# Patient Record
Sex: FEMALE | Race: WHITE | NOT HISPANIC OR LATINO | Employment: OTHER | URBAN - METROPOLITAN AREA
[De-identification: names, ages, dates, MRNs, and addresses within clinical notes are randomized per-mention and may not be internally consistent; named-entity substitution may affect disease eponyms.]

---

## 2021-10-04 ENCOUNTER — PATIENT MESSAGE (OUTPATIENT)
Dept: NEUROLOGY | Facility: CLINIC | Age: 81
End: 2021-10-04

## 2021-11-29 ENCOUNTER — HOSPITAL ENCOUNTER (OUTPATIENT)
Dept: RADIOLOGY | Facility: HOSPITAL | Age: 81
Discharge: HOME | End: 2021-11-29
Attending: PSYCHIATRY & NEUROLOGY
Payer: MEDICARE

## 2021-11-29 ENCOUNTER — OFFICE VISIT (OUTPATIENT)
Dept: NEUROLOGY | Facility: CLINIC | Age: 81
End: 2021-11-29
Payer: MEDICARE

## 2021-11-29 VITALS
SYSTOLIC BLOOD PRESSURE: 148 MMHG | DIASTOLIC BLOOD PRESSURE: 78 MMHG | WEIGHT: 110.69 LBS | RESPIRATION RATE: 14 BRPM | HEART RATE: 50 BPM | BODY MASS INDEX: 21.73 KG/M2 | HEIGHT: 60 IN

## 2021-11-29 DIAGNOSIS — R41.3 OTHER AMNESIA: ICD-10-CM

## 2021-11-29 DIAGNOSIS — R00.1 BRADYCARDIA: Primary | ICD-10-CM

## 2021-11-29 PROCEDURE — 99999 PR STA SHADOW: ICD-10-PCS | Mod: PBBFAC,,, | Performed by: PSYCHIATRY & NEUROLOGY

## 2021-11-29 PROCEDURE — 99214 OFFICE O/P EST MOD 30 MIN: CPT | Mod: PBBFAC,25 | Performed by: PSYCHIATRY & NEUROLOGY

## 2021-11-29 PROCEDURE — 99999 PR PBB SHADOW E&M-EST. PATIENT-LVL IV: CPT | Mod: PBBFAC,,, | Performed by: PSYCHIATRY & NEUROLOGY

## 2021-11-29 PROCEDURE — 70450 CT HEAD WITHOUT CONTRAST: ICD-10-PCS | Mod: 26,,, | Performed by: RADIOLOGY

## 2021-11-29 PROCEDURE — 99204 OFFICE O/P NEW MOD 45 MIN: CPT | Mod: S$PBB | Performed by: PSYCHIATRY & NEUROLOGY

## 2021-11-29 PROCEDURE — 99999 PR STA SHADOW: CPT | Mod: PBBFAC,,, | Performed by: PSYCHIATRY & NEUROLOGY

## 2021-11-29 PROCEDURE — 70450 CT HEAD/BRAIN W/O DYE: CPT | Mod: TC

## 2021-11-29 PROCEDURE — 70450 CT HEAD/BRAIN W/O DYE: CPT | Mod: 26,,, | Performed by: RADIOLOGY

## 2021-11-29 RX ORDER — ROSUVASTATIN CALCIUM 10 MG/1
10 TABLET, COATED ORAL DAILY
COMMUNITY
Start: 2021-10-11

## 2021-11-29 RX ORDER — MULTIVITAMIN
1 TABLET ORAL DAILY
COMMUNITY

## 2021-11-29 RX ORDER — DONEPEZIL HYDROCHLORIDE 5 MG/1
5 TABLET, FILM COATED ORAL DAILY
COMMUNITY
Start: 2021-10-12 | End: 2023-08-16 | Stop reason: SDUPTHER

## 2021-11-29 RX ORDER — LISINOPRIL 20 MG/1
20 TABLET ORAL 2 TIMES DAILY
COMMUNITY
Start: 2021-10-10

## 2021-11-29 RX ORDER — METOPROLOL TARTRATE 50 MG/1
25 TABLET ORAL DAILY
COMMUNITY
Start: 2021-11-20 | End: 2023-08-16

## 2022-01-25 ENCOUNTER — TELEPHONE (OUTPATIENT)
Dept: NEUROLOGY | Facility: HOSPITAL | Age: 82
End: 2022-01-25
Payer: MEDICARE

## 2022-01-25 RX ORDER — MEMANTINE HYDROCHLORIDE 5 MG/1
TABLET ORAL
Qty: 60 TABLET | Refills: 11 | Status: SHIPPED | OUTPATIENT
Start: 2022-01-25 | End: 2022-06-23

## 2022-01-25 NOTE — TELEPHONE ENCOUNTER
Notify patient's family: Memory testing results (which I was told they have also reviewed with neuropsychology) does show changes consistent with  Alzheimer's. Her history of high blood pressure may have also contributed but mostly the findings are due to Alzheimer's.   I want to review the following:    Continue donepezil for memory.  Lets add Namenda- another med for memory- per orders. Usually this is well tolerated.  Regarding her driving: Please be sure family is going to monitor her driving closely. If there are any concerning incidents, she should stop driving or consider a formal driving evaluation.    Keep follow up appointment as scheduled        MD noted: AD with possible additional mixed etiology from vascular risk factors

## 2022-04-11 ENCOUNTER — OFFICE VISIT (OUTPATIENT)
Dept: NEUROLOGY | Facility: CLINIC | Age: 82
End: 2022-04-11
Payer: MEDICARE

## 2022-04-11 VITALS
DIASTOLIC BLOOD PRESSURE: 78 MMHG | HEART RATE: 70 BPM | BODY MASS INDEX: 23.33 KG/M2 | RESPIRATION RATE: 14 BRPM | HEIGHT: 59 IN | SYSTOLIC BLOOD PRESSURE: 164 MMHG | WEIGHT: 115.75 LBS

## 2022-04-11 DIAGNOSIS — G30.9 ALZHEIMER DISEASE: Primary | ICD-10-CM

## 2022-04-11 DIAGNOSIS — I10 PRIMARY HYPERTENSION: ICD-10-CM

## 2022-04-11 DIAGNOSIS — I35.0 AORTIC VALVE STENOSIS, ETIOLOGY OF CARDIAC VALVE DISEASE UNSPECIFIED: ICD-10-CM

## 2022-04-11 DIAGNOSIS — F02.80 ALZHEIMER DISEASE: Primary | ICD-10-CM

## 2022-04-11 PROCEDURE — 99214 OFFICE O/P EST MOD 30 MIN: CPT | Mod: S$PBB | Performed by: PSYCHIATRY & NEUROLOGY

## 2022-04-11 PROCEDURE — 99214 OFFICE O/P EST MOD 30 MIN: CPT | Mod: PBBFAC | Performed by: PSYCHIATRY & NEUROLOGY

## 2022-04-11 PROCEDURE — 99999 PR PBB SHADOW E&M-EST. PATIENT-LVL IV: CPT | Mod: PBBFAC,,, | Performed by: PSYCHIATRY & NEUROLOGY

## 2022-04-11 PROCEDURE — 99999 PR PBB SHADOW E&M-EST. PATIENT-LVL IV: ICD-10-PCS | Mod: PBBFAC,,, | Performed by: PSYCHIATRY & NEUROLOGY

## 2022-04-11 PROCEDURE — 99999 PR STA SHADOW: CPT | Mod: PBBFAC,,, | Performed by: PSYCHIATRY & NEUROLOGY

## 2022-04-11 RX ORDER — LANOLIN ALCOHOL/MO/W.PET/CERES
1000 CREAM (GRAM) TOPICAL DAILY
COMMUNITY

## 2022-04-11 NOTE — PROGRESS NOTES
HPI: Fanta Valdez is a 81 y.o. female with a history of memory problems    Namenda is well tolerated  Memory is seems to be the same per patient and family states she seems a little worse.  Mood been good.   B12 being used orally    Pulse is good  Driving without accidents or incidents to local familiar places.     Patient saw cardiology and is pending Aortic valve repair.    Recently had labs with her cardiologist in [place]    Granddaughter here today    Patient lives alone and has support from  daughter and granddaughter     Review of Systems   Constitutional: Negative for fever.   HENT: Negative for nosebleeds.    Eyes: Negative for double vision.   Respiratory: Negative for hemoptysis.    Cardiovascular: Negative for leg swelling.   Gastrointestinal: Negative for blood in stool.   Genitourinary: Negative for hematuria.   Musculoskeletal: Negative for falls.   Skin: Negative for rash.   Neurological: Positive for tremors.   Psychiatric/Behavioral: Positive for memory loss.         I have reviewed all of this patient's past medical and surgical histories as well as family and social histories and active allergies and medications as documented in the electronic medical record.        Exam:  Gen Appearance, well developed/nourished in no apparent distress  CV: 2+ distal pulses with no edema or swelling  Neuro:  MS: Awake, alert, oriented to place, person, but not to month and but to day of week, and intact situation. Sustains attention. Recent recall is poor and she repeats self in conversation/remote memory intact, Language is full to spontaneous speech/repetition/naming/comprehension. Fund of Knowledge is full  Patient is stating good current events   She is able to tell me she wants her daughter Kathleen to be her POA. She feels Kathleen is more experience and has more time to take of her personal matters.   CN: Optic discs are flat with normal vasculature, PERRL, Extraoccular movements and visual fields are full.  Normal facial sensation and strength, Hearing symmetric, Tongue and Palate are midline and strong. Shoulder Shrug symmetric and strong.  Motor: Normal bulk, tone, no abnormal movements. 5/5 strength bilateral upper/lower extremities with 2+ reflexes and no clonus  Sensory: symmetric to light touch, pain, temp, and vibration/proprioception. Romberg negative  Cerebellar: Finger-nose,Heal-shin, Rapid alternating movements intact  Gait: Normal stance, no ataxia    Imagin CT head: 1.  No CT evidence of an acute intracranial abnormality.     2.  Atrophy and small vessel ischemic changes of the periventricular white matter.    Labs:  TSH and folate normal  B12 lower laury    Assessment/Plan: Fanta Valdez is a 81 y.o. female with short term memory loss.   I recommend:   1.  B12 level lower normal: Continue OTC B12 daily. Follow up B12 level at the next visit  2. Neuropsychological testing: consistent with  Alzheimer's. Her history of high blood pressure may have also contributed but mostly the findings are due to Alzheimer's.   - Namenda to continue at current dose. Family will get all recent labs to me. Consider raising Namenda dose but not until after aortic valve repair (family can call for dose raise once recovered)  3. Regarding her driving: Family is going to monitor her driving closely. If there are any concerning incidents, she should stop driving or consider a formal driving evaluation.  4.  She is on Aricept per PCP with some bradycardia noted prior (asymptomatic). Will not raise dose (also on metoprolol)  -Will not raise dose at this time  5. This patient has the capacity for decision making at this time. Will fill any legal paperwork needed by family regarding this/ appointing her daughter Kathleen as POA  RTC 6 months

## 2022-04-28 ENCOUNTER — TELEPHONE (OUTPATIENT)
Dept: NEUROLOGY | Facility: CLINIC | Age: 82
End: 2022-04-28
Payer: MEDICARE

## 2022-04-28 NOTE — TELEPHONE ENCOUNTER
Patients daughter Kathleen Willis, would like letter drawn up concerning mothers condition for her to get POA. She would like it faxed to [facility] office [phone]. (Yane Salguero)

## 2022-04-28 NOTE — TELEPHONE ENCOUNTER
----- Message from Homer Fitzpatrick sent at 4/28/2022  1:15 PM CDT -----  Name Of Caller: Kathleen            Provider Name: Avel Castillo            Does patient feel the need to be seen today? no            Relationship to the Pt?: daughter            Contact Preference?:[phone]            What is the nature of the call?: Patient's daughter would like to speak to office about a letter regarding power of

## 2022-04-29 ENCOUNTER — PATIENT MESSAGE (OUTPATIENT)
Dept: NEUROLOGY | Facility: CLINIC | Age: 82
End: 2022-04-29
Payer: MEDICARE

## 2022-04-29 NOTE — TELEPHONE ENCOUNTER
Per Jodi, letter was mailed because fax number did not work. Patient is aware. See patient portal message from 4/29/2022.

## 2022-05-12 ENCOUNTER — PATIENT MESSAGE (OUTPATIENT)
Dept: NEUROLOGY | Facility: CLINIC | Age: 82
End: 2022-05-12
Payer: MEDICARE

## 2022-05-31 ENCOUNTER — PATIENT MESSAGE (OUTPATIENT)
Dept: NEUROLOGY | Facility: CLINIC | Age: 82
End: 2022-05-31
Payer: MEDICARE

## 2022-06-21 ENCOUNTER — PATIENT MESSAGE (OUTPATIENT)
Dept: NEUROLOGY | Facility: CLINIC | Age: 82
End: 2022-06-21
Payer: MEDICARE

## 2022-06-23 DIAGNOSIS — F02.80 ALZHEIMER DISEASE: Primary | ICD-10-CM

## 2022-06-23 DIAGNOSIS — G30.9 ALZHEIMER DISEASE: Primary | ICD-10-CM

## 2022-06-23 RX ORDER — MEMANTINE HYDROCHLORIDE 10 MG/1
TABLET ORAL
Qty: 60 TABLET | Refills: 11 | Status: SHIPPED | OUTPATIENT
Start: 2022-06-23 | End: 2023-07-03

## 2022-08-28 ENCOUNTER — PATIENT MESSAGE (OUTPATIENT)
Dept: NEUROLOGY | Facility: CLINIC | Age: 82
End: 2022-08-28
Payer: MEDICARE

## 2022-09-09 ENCOUNTER — TELEPHONE (OUTPATIENT)
Dept: NEUROLOGY | Facility: CLINIC | Age: 82
End: 2022-09-09
Payer: MEDICARE

## 2022-09-09 NOTE — TELEPHONE ENCOUNTER
Patient's granddaughter states she has noticed a decline in patient's memory, and has been making certain comments about walking to the store by herself and things that have made her granddaughter worried.   Ms. Jewell lives alone.  She just wanted  some advice on how they can help her and what steps to take next.

## 2022-09-09 NOTE — TELEPHONE ENCOUNTER
----- Message from Palmira Matthews MA sent at 2022 10:45 AM CDT -----  Contact: Sophie / shyanne Valdez  MRN: [id number]  : 1940  PCP: HEATHER Mcmahon  Home Phone      [phone]  Work Phone      Not on file.  Mobile          [phone]      MESSAGE:  Would like to speak to nurse regarding a decrease in patients health.      Phone:  [phone]

## 2022-09-12 NOTE — TELEPHONE ENCOUNTER
The can consider a door alarm or camera to help monitor her more closely. If she does  begins to wander out of the home when she should not, she will need 24 hour supervision.

## 2022-10-13 ENCOUNTER — LAB VISIT (OUTPATIENT)
Dept: LAB | Facility: HOSPITAL | Age: 82
End: 2022-10-13
Attending: PSYCHIATRY & NEUROLOGY
Payer: MEDICARE

## 2022-10-13 ENCOUNTER — OFFICE VISIT (OUTPATIENT)
Dept: NEUROLOGY | Facility: CLINIC | Age: 82
End: 2022-10-13
Payer: MEDICARE

## 2022-10-13 VITALS
SYSTOLIC BLOOD PRESSURE: 142 MMHG | WEIGHT: 113.75 LBS | RESPIRATION RATE: 14 BRPM | BODY MASS INDEX: 22.33 KG/M2 | DIASTOLIC BLOOD PRESSURE: 72 MMHG | HEIGHT: 60 IN | HEART RATE: 58 BPM

## 2022-10-13 DIAGNOSIS — F02.80 ALZHEIMER DISEASE: Primary | ICD-10-CM

## 2022-10-13 DIAGNOSIS — G30.9 ALZHEIMER DISEASE: Primary | ICD-10-CM

## 2022-10-13 DIAGNOSIS — R00.1 BRADYCARDIA: ICD-10-CM

## 2022-10-13 DIAGNOSIS — I10 PRIMARY HYPERTENSION: ICD-10-CM

## 2022-10-13 DIAGNOSIS — E53.8 B12 DEFICIENCY: ICD-10-CM

## 2022-10-13 LAB — VIT B12 SERPL-MCNC: 300 PG/ML (ref 210–950)

## 2022-10-13 PROCEDURE — 99213 OFFICE O/P EST LOW 20 MIN: CPT | Mod: PBBFAC | Performed by: PSYCHIATRY & NEUROLOGY

## 2022-10-13 PROCEDURE — 82607 VITAMIN B-12: CPT | Performed by: PSYCHIATRY & NEUROLOGY

## 2022-10-13 PROCEDURE — 99999 PR STA SHADOW: CPT | Mod: PBBFAC,,, | Performed by: PSYCHIATRY & NEUROLOGY

## 2022-10-13 PROCEDURE — 36415 COLL VENOUS BLD VENIPUNCTURE: CPT | Performed by: PSYCHIATRY & NEUROLOGY

## 2022-10-13 PROCEDURE — 99999 PR PBB SHADOW E&M-EST. PATIENT-LVL III: CPT | Mod: PBBFAC,,, | Performed by: PSYCHIATRY & NEUROLOGY

## 2022-10-13 PROCEDURE — 99214 OFFICE O/P EST MOD 30 MIN: CPT | Mod: S$PBB | Performed by: PSYCHIATRY & NEUROLOGY

## 2022-10-13 PROCEDURE — 99999 PR STA SHADOW: ICD-10-PCS | Mod: PBBFAC,,, | Performed by: PSYCHIATRY & NEUROLOGY

## 2022-10-13 NOTE — PROGRESS NOTES
HPI: Fanta Valdez is a 82 y.o. female with a history of memory problems    Namenda does was increased since the last visit      Patient was talking about leaving the home and driving to less familiar places when family felt she should not     I encouraged closer monitoring and family states this is resolved.      Driving with no accidents or incidents and she has limited her driving to local familiar places which family is happy with. She does have cell phone to use. She seems to have good insignt into her limits and memory per family    No wandering    NO hallucinations    Alone during the day and does great with that.     Anti HTN meds are being adjusted by other providers    Memory has worsened a bit with short term memory. More frequent forgetfulness but still fully independent with ADLs.     Mood is good.     Still on B12    Pusle is 59    Clarification: never had any tremor.    Stays very active in her yard. Keeps all of her housework up     No longer cooks    Granddaughter here today    Patient lives alone and has support from  daughter and granddaughter     Review of Systems   Constitutional:  Negative for fever.   HENT:  Negative for nosebleeds.    Eyes:  Negative for double vision.   Respiratory:  Negative for hemoptysis.    Cardiovascular:  Negative for leg swelling.   Gastrointestinal:  Negative for blood in stool.   Genitourinary:  Negative for hematuria.   Musculoskeletal:  Negative for falls.   Skin:  Negative for rash.   Neurological:  Negative for tremors.   Psychiatric/Behavioral:  Positive for memory loss.        I have reviewed all of this patient's past medical and surgical histories as well as family and social histories and active allergies and medications as documented in the electronic medical record.        Exam:  Gen Appearance, well developed/nourished in no apparent distress  CV: 2+ distal pulses with no edema or swelling  Neuro:  MS: Awake, alert, oriented to place, person, but not  to month and but to day of week with prompting, and intact situation. Sustains attention. Recent recall is challenged and she repeats self in conversation at times/remote memory intact, Language is full to spontaneous speech/repetition/naming/comprehension. Fund of Knowledge is full  CN: Optic discs are flat with normal vasculature, PERRL, Extraoccular movements and visual fields are full. Normal facial sensation and strength, Hearing symmetric, Tongue and Palate are midline and strong. Shoulder Shrug symmetric and strong.  Motor: Normal bulk, tone, no abnormal movements. 5/5 strength bilateral upper/lower extremities with 2+ reflexes and no clonus  Sensory: symmetric to light touch, pain, temp, and vibration/proprioception. Romberg negative  Cerebellar: Finger-nose,Heal-shin, Rapid alternating movements intact  Gait: Normal stance, no ataxia    Imagin CT head: 1.  No CT evidence of an acute intracranial abnormality.     2.  Atrophy and small vessel ischemic changes of the periventricular white matter.    Labs:  TSH and folate normal  B12 lower laury    Assessment/Plan: Fanta Valdez is a 82 y.o. female with short term memory loss.   I recommend:     1.  B12 level lower normal: Continue OTC B12 daily. Follow up B12 level now  2. Neuropsychological testing: consistent with  Alzheimer's. Her history of high blood pressure may have also contributed but mostly the findings are due to Alzheimer's.   - Namenda to continue   -She is on Aricept per PCP with some bradycardia noted (asymptomatic). Will not raise dose (also on metoprolol)  -Discussed the importance of good social, physical, and cognitive exercises. Discussed how the Mediterranean diet may reduce the risk of cognitive decline.  3. Regarding her driving: Family is going to monitor her driving closely. If there are any concerning incidents, she should stop driving or consider a formal driving evaluation.  -Currently, she has self reduced to local  driving only. Family will monitor for any changes which would require her to stop.    RTC 6 months

## 2022-10-20 ENCOUNTER — PATIENT MESSAGE (OUTPATIENT)
Dept: NEUROLOGY | Facility: CLINIC | Age: 82
End: 2022-10-20
Payer: MEDICARE

## 2023-04-06 ENCOUNTER — TELEPHONE (OUTPATIENT)
Dept: NEUROLOGY | Facility: CLINIC | Age: 83
End: 2023-04-06
Payer: MEDICARE

## 2023-04-06 NOTE — TELEPHONE ENCOUNTER
Is there an involvement of care signed for this patient?  If not, advise daughter that we can't discuss her care by phone and patient will need a visit if any treatment changes are desired.

## 2023-04-06 NOTE — TELEPHONE ENCOUNTER
Notified Kathleen, voiced understanding. Kathleen states brother has tracker on patient's phone, making several trips to the mailbox, calling several times a day for the same conversation, desires to discuss driving privileges and overall increase of symptoms. Notified Kathleen appointment added to wait list due to declines appointment with neurology PA or NP. Patient has current appointment scheduled for 5/4/2023.

## 2023-04-06 NOTE — TELEPHONE ENCOUNTER
----- Message from Yen Ruiz sent at 2023  1:46 PM CDT -----  Contact: DAUGHTER - FRANCOIS Valdez  MRN: [id number]  : 1940  PCP: HEATHER Mcmahon  Home Phone      [phone]  Work Phone      Not on file.  Mobile          [phone]      MESSAGE: Daughter states they are planning to have a family meeting with the patient to take her keys away from and would like to see if Dr. Castillo can call her something in to help with the anxiety this may cause. She would like it sent to [facility] [employer].        Phone: [phone]

## 2023-04-18 ENCOUNTER — OFFICE VISIT (OUTPATIENT)
Dept: NEUROLOGY | Facility: CLINIC | Age: 83
End: 2023-04-18
Payer: MEDICARE

## 2023-04-18 VITALS
WEIGHT: 112 LBS | OXYGEN SATURATION: 95 % | SYSTOLIC BLOOD PRESSURE: 128 MMHG | HEART RATE: 68 BPM | DIASTOLIC BLOOD PRESSURE: 70 MMHG | BODY MASS INDEX: 21.99 KG/M2 | HEIGHT: 60 IN | RESPIRATION RATE: 14 BRPM

## 2023-04-18 DIAGNOSIS — I10 HYPERTENSION, UNSPECIFIED TYPE: ICD-10-CM

## 2023-04-18 DIAGNOSIS — F02.811 ALZHEIMER'S DEMENTIA WITH AGITATION, UNSPECIFIED DEMENTIA SEVERITY, UNSPECIFIED TIMING OF DEMENTIA ONSET: Primary | ICD-10-CM

## 2023-04-18 DIAGNOSIS — E78.5 HYPERLIPIDEMIA, UNSPECIFIED HYPERLIPIDEMIA TYPE: ICD-10-CM

## 2023-04-18 DIAGNOSIS — F41.9 ANXIETY: ICD-10-CM

## 2023-04-18 DIAGNOSIS — G30.9 ALZHEIMER'S DEMENTIA WITH AGITATION, UNSPECIFIED DEMENTIA SEVERITY, UNSPECIFIED TIMING OF DEMENTIA ONSET: Primary | ICD-10-CM

## 2023-04-18 DIAGNOSIS — R45.1 AGITATION: ICD-10-CM

## 2023-04-18 PROBLEM — F02.80 ALZHEIMER'S DEMENTIA: Status: ACTIVE | Noted: 2023-04-18

## 2023-04-18 PROCEDURE — 99214 OFFICE O/P EST MOD 30 MIN: CPT | Mod: PBBFAC | Performed by: NURSE PRACTITIONER

## 2023-04-18 PROCEDURE — 99999 PR STA SHADOW: ICD-10-PCS | Mod: PBBFAC,,, | Performed by: NURSE PRACTITIONER

## 2023-04-18 PROCEDURE — 99999 PR PBB SHADOW E&M-EST. PATIENT-LVL IV: CPT | Mod: PBBFAC,,, | Performed by: NURSE PRACTITIONER

## 2023-04-18 PROCEDURE — 99215 OFFICE O/P EST HI 40 MIN: CPT | Mod: S$PBB | Performed by: NURSE PRACTITIONER

## 2023-04-18 PROCEDURE — 99999 PR STA SHADOW: CPT | Mod: PBBFAC,,, | Performed by: NURSE PRACTITIONER

## 2023-04-18 RX ORDER — SERTRALINE HYDROCHLORIDE 25 MG/1
25 TABLET, FILM COATED ORAL DAILY
Qty: 30 TABLET | Refills: 3 | Status: SHIPPED | OUTPATIENT
Start: 2023-04-18 | End: 2023-07-27

## 2023-04-18 NOTE — PATIENT INSTRUCTIONS
You are advised to stop driving, due to memory changes.     You are advised to stop pushing a motorized lawnmower.

## 2023-04-18 NOTE — PROGRESS NOTES
HPI: Fanta Valdez is a 82 y.o. female with evidence for Alzheimer's disease. She has HTN and HLD.     She presents today with family's complaint of worsening memory loss. There is a tracker on her phone, and her son has noted that she makes several trips to the mail box each day, and makes repeated phone calls to the same person to have the same conversation. Family is concerned about her driving. Family also called clinic to request medication to treat any anxiety associated with the family taking away patient's driving privileges, and family was advised to have patient come in for an appointment.     Her granddaughter, who is here with her today, signed an involvement of care form today.     She repeats more questions during conversations with family. Her recall is much worse.     She continues to cut grass with a push mower during the day. She drove somewhere last week, and family has taken away her ability to drive. She has frustration related to this, but has had agitation and anxiety months before this started.     No delusions, hallucinations, falls, or tremors.     She is sleeping well at night, and has a good appetite.     She continues on Namenda and Aricept.     She is not wandering. She lives alone, and has Meals on Wheels. She states that she cooks at home, but family denies this.     Anti HTN meds are being adjusted by other providers    Stays very active in her yard. Keeps all of her housework up.    Review of Systems   Unable to perform ROS: Dementia       I have reviewed all of this patient's past medical and surgical histories as well as family and social histories and active allergies and medications as documented in the electronic medical record.    Exam:  Gen Appearance, well developed/nourished in no apparent distress  CV: 2+ distal pulses with no edema or swelling  Neuro:  MS: Awake, alert, oriented to place, person, but not to month and but to day of week with prompting, and intact  situation. Sustains attention. Recent recall is at least moderately challenged and she repeats self in conversation many times minutes apart/remote memory intact, Language is full to spontaneous speech/repetition/naming/comprehension. Fund of Knowledge is full.   *Clock drawing is impaired today-poor planning/spatial/graphical impairment.   CN: PERRL, Extraoccular movements and visual fields are full. Normal facial sensation and strength, Hearing symmetric, Tongue and Palate are midline and strong. Shoulder Shrug symmetric and strong.  Motor: Normal bulk, tone, no abnormal movements. 5/5 strength bilateral upper/lower extremities with 2+ reflexes and no clonus  Sensory: symmetric to light touch, pain, temp, and vibration/proprioception. Romberg negative  Cerebellar: Finger-nose,Heal-shin, Rapid alternating movements intact  Gait: Normal stance, no ataxia    Imagin CT head:   1.  No CT evidence of an acute intracranial abnormality.  2.  Atrophy and small vessel ischemic changes of the periventricular white matter.    Labs:    TSH and folate normal  B12 lower normal    Assessment/Plan: Fanta Valdez is a 82 y.o. female with short term memory loss.     I recommend:   1.  B12 level lower normal: Continue OTC B12 daily. Follow up B12 level now.     2. Neuropsychological testing: consistent with Alzheimer's. Her history of high blood pressure may have also contributed but mostly the findings are due to Alzheimer's.   -Namenda to continue   -She is on Aricept per PCP with some bradycardia noted (asymptomatic). Will not raise dose (also on metoprolol)  -Discussed the importance of good social, physical, and cognitive exercises. Discussed how the Mediterranean diet may reduce the risk of cognitive decline.    3. Regarding her driving: Given her progressive memory loss, it is my medical opinion that it is not safe for her to drive. I advised her to turn her license in. Family agrees to take her means to  drive away. I additionally advised her to stop cutting her grass with a push mower, due to safety concerns.   -alternative activities were discussed to help patient pass her time during the day.     4. Start trial of Zoloft 25 mg to treat agitation/anxiety. Monitor for any worsening of mood.   To ER with any threat of harm to self or others.     RTC 3 months with me, then 6 months with Dr. Castillo.   50 minutes of total time spent on the encounter, which includes face to face time and non-face to face time preparing to see the patient (eg, review of tests), Obtaining and/or reviewing separately obtained history, Documenting clinical information in the electronic or other health record, Independently interpreting results (not separately reported) and communicating results to the patient/family/caregiver, or Care coordination (not separately reported).

## 2023-06-16 ENCOUNTER — OFFICE VISIT (OUTPATIENT)
Dept: ENDOCRINOLOGY | Facility: CLINIC | Age: 83
End: 2023-06-16
Payer: MEDICARE

## 2023-06-16 VITALS
DIASTOLIC BLOOD PRESSURE: 69 MMHG | WEIGHT: 110 LBS | HEART RATE: 61 BPM | HEIGHT: 60 IN | RESPIRATION RATE: 16 BRPM | SYSTOLIC BLOOD PRESSURE: 124 MMHG | BODY MASS INDEX: 21.6 KG/M2

## 2023-06-16 DIAGNOSIS — I10 PRIMARY HYPERTENSION: ICD-10-CM

## 2023-06-16 DIAGNOSIS — M81.0 AGE-RELATED OSTEOPOROSIS WITHOUT CURRENT PATHOLOGICAL FRACTURE: ICD-10-CM

## 2023-06-16 DIAGNOSIS — E78.2 MIXED HYPERLIPIDEMIA: ICD-10-CM

## 2023-06-16 PROCEDURE — 99999 PR STA SHADOW: CPT | Mod: PBBFAC,,, | Performed by: STUDENT IN AN ORGANIZED HEALTH CARE EDUCATION/TRAINING PROGRAM

## 2023-06-16 PROCEDURE — 99204 OFFICE O/P NEW MOD 45 MIN: CPT | Mod: S$PBB | Performed by: STUDENT IN AN ORGANIZED HEALTH CARE EDUCATION/TRAINING PROGRAM

## 2023-06-16 PROCEDURE — 99214 OFFICE O/P EST MOD 30 MIN: CPT | Mod: PBBFAC | Performed by: STUDENT IN AN ORGANIZED HEALTH CARE EDUCATION/TRAINING PROGRAM

## 2023-06-16 PROCEDURE — 99999 PR PBB SHADOW E&M-EST. PATIENT-LVL IV: CPT | Mod: PBBFAC,,, | Performed by: STUDENT IN AN ORGANIZED HEALTH CARE EDUCATION/TRAINING PROGRAM

## 2023-06-16 PROCEDURE — 99999 PR STA SHADOW: ICD-10-PCS | Mod: PBBFAC,,, | Performed by: STUDENT IN AN ORGANIZED HEALTH CARE EDUCATION/TRAINING PROGRAM

## 2023-06-16 NOTE — PROGRESS NOTES
Subjective:      Patient ID: Fanta Valdez is a 82 y.o. female.    Chief Complaint:  Osteoporosis    History of Present Illness  This is a 82 y.o. female. with a past medical history of HTN, HLD here for evaluation of osteoporosis.    Osteoporosis  Diagnosed in 2021    No history of fractures  No history of kidney stones  No height loss  Menopause at age late 40s  No exposure to glucocorticoids  No history of smoking  Family history of hip fracture in mother  Not on PPI therapy    No recent falls  Takes fall precautions  She uses rails for stairs   Takes baths   Always walking outside    Dental visits: 2022, no issues    DXA (Jan 2021)  L spine: BMD 0.84, T score - 2.8  Bilateral femurs: 0.70, T score -2.5  Impression: Osteoporosis      Review of Systems  As above    Social and family history reviewed  Current medications and allergies reviewed    Objective:   /69 (BP Location: Left arm, Patient Position: Sitting, BP Method: Medium (Manual))   Pulse 61   Resp 16   Ht 5' (1.524 m)   Wt 49.9 kg (110 lb)   BMI 21.48 kg/m²   Physical Exam  Alert, oriented    BP Readings from Last 1 Encounters:   06/16/23 124/69      Wt Readings from Last 1 Encounters:   06/16/23 1422 49.9 kg (110 lb)     Body mass index is 21.48 kg/m².    Lab Review:   Lab Results   Component Value Date    HGBA1C 6.1 05/02/2022     No results found for: CHOL, HDL, LDLCALC, TRIG, CHOLHDL  Lab Results   Component Value Date    TSH 2.565 11/29/2021       All pertinent labs reviewed    Assessment and Plan     Age related osteoporosis  Referred for osteoporosis but last BMD is from over 2 years ago - will update  Will do basic workup for secondary causes but suspect related to ethnicity, weight, postmenopausal state, family history  Pending on results will start treatment - daughter has been on Prolia and has done well so open to this treatment  Discussed non-pharmacological interventions in detail: fall precautions, calcium intake,  exercise    Plan  - Check TSH, vitamin D, CMP, PTH  - Recheck DXA         HLD (hyperlipidemia)  Continue statin    HTN (hypertension)  Continue antihypertensive regimen including ARB/ACEi      Follow-up in 6 months    Forest Smith MD  Endocrinology

## 2023-06-16 NOTE — ASSESSMENT & PLAN NOTE
Referred for osteoporosis but last BMD is from over 2 years ago - will update  Will do basic workup for secondary causes but suspect related to ethnicity, weight, postmenopausal state, family history  Pending on results will start treatment - daughter has been on Prolia and has done well so open to this treatment  Discussed non-pharmacological interventions in detail: fall precautions, calcium intake, exercise    Plan  - Check TSH, vitamin D, CMP, PTH  - Recheck DXA        hide

## 2023-07-03 DIAGNOSIS — F02.80 ALZHEIMER DISEASE: ICD-10-CM

## 2023-07-03 DIAGNOSIS — G30.9 ALZHEIMER DISEASE: ICD-10-CM

## 2023-07-03 RX ORDER — MEMANTINE HYDROCHLORIDE 10 MG/1
10 TABLET ORAL 2 TIMES DAILY
Qty: 60 TABLET | Refills: 3 | Status: SHIPPED | OUTPATIENT
Start: 2023-07-03 | End: 2023-08-16 | Stop reason: SDUPTHER

## 2023-07-14 ENCOUNTER — HOSPITAL ENCOUNTER (OUTPATIENT)
Dept: RADIOLOGY | Facility: HOSPITAL | Age: 83
Discharge: HOME | End: 2023-07-14
Attending: STUDENT IN AN ORGANIZED HEALTH CARE EDUCATION/TRAINING PROGRAM
Payer: MEDICARE

## 2023-07-14 DIAGNOSIS — M81.0 AGE-RELATED OSTEOPOROSIS WITHOUT CURRENT PATHOLOGICAL FRACTURE: ICD-10-CM

## 2023-07-14 PROCEDURE — 77080 DXA BONE DENSITY AXIAL: CPT | Mod: TC

## 2023-07-14 PROCEDURE — 77080 DXA BONE DENSITY AXIAL SKELETON 1 OR MORE SITES: ICD-10-PCS | Mod: 26,,, | Performed by: RADIOLOGY

## 2023-07-14 PROCEDURE — 77080 DXA BONE DENSITY AXIAL: CPT | Mod: 26,,, | Performed by: RADIOLOGY

## 2023-07-17 ENCOUNTER — PATIENT MESSAGE (OUTPATIENT)
Dept: ENDOCRINOLOGY | Facility: CLINIC | Age: 83
End: 2023-07-17
Payer: MEDICARE

## 2023-07-18 ENCOUNTER — TELEPHONE (OUTPATIENT)
Dept: ENDOCRINOLOGY | Facility: CLINIC | Age: 83
End: 2023-07-18
Payer: MEDICARE

## 2023-07-18 PROBLEM — M81.0 AGE-RELATED OSTEOPOROSIS WITHOUT CURRENT PATHOLOGICAL FRACTURE: Status: ACTIVE | Noted: 2023-07-18

## 2023-07-18 NOTE — TELEPHONE ENCOUNTER
Called daughter back she was asking for injection scheduling, if they can contact the daughter young, asked how much would it be, advised daughter that that would be something for the infusion center and insurance to discuss  verbalized understanding.          ----- Message from Chery Gonzalez sent at 2023 10:52 AM CDT -----  Contact: young/daughter  Fanta Valdez  MRN: [id number]  : 1940  PCP: HEATHER Mcmahon  Home Phone      [phone]  Work Phone      Not on file.  Mobile          [phone]      MESSAGE: have questions and concerns about her Prolia injection, did informed her we do not have a price she would have to contact her insurance company    Phone [phone]

## 2023-07-19 ENCOUNTER — PATIENT MESSAGE (OUTPATIENT)
Dept: NEUROLOGY | Facility: CLINIC | Age: 83
End: 2023-07-19
Payer: MEDICARE

## 2023-07-27 DIAGNOSIS — R45.1 AGITATION: ICD-10-CM

## 2023-07-27 DIAGNOSIS — F41.9 ANXIETY: ICD-10-CM

## 2023-07-27 RX ORDER — SERTRALINE HYDROCHLORIDE 25 MG/1
25 TABLET, FILM COATED ORAL DAILY
Qty: 30 TABLET | Refills: 3 | Status: SHIPPED | OUTPATIENT
Start: 2023-07-27 | End: 2023-08-16 | Stop reason: SDUPTHER

## 2023-08-04 ENCOUNTER — INFUSION (OUTPATIENT)
Dept: INFUSION THERAPY | Facility: HOSPITAL | Age: 83
End: 2023-08-04
Attending: STUDENT IN AN ORGANIZED HEALTH CARE EDUCATION/TRAINING PROGRAM
Payer: MEDICARE

## 2023-08-04 VITALS
TEMPERATURE: 98 F | SYSTOLIC BLOOD PRESSURE: 144 MMHG | HEART RATE: 76 BPM | DIASTOLIC BLOOD PRESSURE: 82 MMHG | RESPIRATION RATE: 18 BRPM

## 2023-08-04 DIAGNOSIS — M81.0 AGE-RELATED OSTEOPOROSIS WITHOUT CURRENT PATHOLOGICAL FRACTURE: Primary | ICD-10-CM

## 2023-08-04 PROCEDURE — 63600175 PHARM REV CODE 636 W HCPCS: Mod: JZ,TB | Performed by: STUDENT IN AN ORGANIZED HEALTH CARE EDUCATION/TRAINING PROGRAM

## 2023-08-04 PROCEDURE — 96372 THER/PROPH/DIAG INJ SC/IM: CPT

## 2023-08-04 RX ADMIN — DENOSUMAB 60 MG: 60 INJECTION SUBCUTANEOUS at 02:08

## 2023-08-16 ENCOUNTER — OFFICE VISIT (OUTPATIENT)
Dept: NEUROLOGY | Facility: CLINIC | Age: 83
End: 2023-08-16
Payer: MEDICARE

## 2023-08-16 VITALS
BODY MASS INDEX: 20.84 KG/M2 | WEIGHT: 106.13 LBS | DIASTOLIC BLOOD PRESSURE: 58 MMHG | OXYGEN SATURATION: 97 % | RESPIRATION RATE: 14 BRPM | SYSTOLIC BLOOD PRESSURE: 128 MMHG | HEIGHT: 60 IN | HEART RATE: 55 BPM

## 2023-08-16 DIAGNOSIS — I10 PRIMARY HYPERTENSION: ICD-10-CM

## 2023-08-16 DIAGNOSIS — E78.2 MIXED HYPERLIPIDEMIA: ICD-10-CM

## 2023-08-16 DIAGNOSIS — R45.1 AGITATION: ICD-10-CM

## 2023-08-16 DIAGNOSIS — F02.811 ALZHEIMER'S DEMENTIA WITH AGITATION, UNSPECIFIED DEMENTIA SEVERITY, UNSPECIFIED TIMING OF DEMENTIA ONSET: Primary | ICD-10-CM

## 2023-08-16 DIAGNOSIS — F41.9 ANXIETY: ICD-10-CM

## 2023-08-16 DIAGNOSIS — G30.9 ALZHEIMER'S DEMENTIA WITH AGITATION, UNSPECIFIED DEMENTIA SEVERITY, UNSPECIFIED TIMING OF DEMENTIA ONSET: Primary | ICD-10-CM

## 2023-08-16 DIAGNOSIS — G30.9 ALZHEIMER DISEASE: ICD-10-CM

## 2023-08-16 DIAGNOSIS — F02.80 ALZHEIMER DISEASE: ICD-10-CM

## 2023-08-16 PROCEDURE — 99999 PR PBB SHADOW E&M-EST. PATIENT-LVL III: CPT | Mod: PBBFAC,,, | Performed by: NURSE PRACTITIONER

## 2023-08-16 PROCEDURE — 99999 PR STA SHADOW: CPT | Mod: PBBFAC,,, | Performed by: NURSE PRACTITIONER

## 2023-08-16 PROCEDURE — 99214 OFFICE O/P EST MOD 30 MIN: CPT | Mod: S$PBB | Performed by: NURSE PRACTITIONER

## 2023-08-16 PROCEDURE — 99999 PR STA SHADOW: ICD-10-PCS | Mod: PBBFAC,,, | Performed by: NURSE PRACTITIONER

## 2023-08-16 PROCEDURE — 99213 OFFICE O/P EST LOW 20 MIN: CPT | Mod: PBBFAC | Performed by: NURSE PRACTITIONER

## 2023-08-16 RX ORDER — MEMANTINE HYDROCHLORIDE 10 MG/1
10 TABLET ORAL 2 TIMES DAILY
Qty: 180 TABLET | Refills: 1 | Status: SHIPPED | OUTPATIENT
Start: 2023-08-16 | End: 2024-03-05

## 2023-08-16 RX ORDER — METOPROLOL SUCCINATE 50 MG/1
50 TABLET, EXTENDED RELEASE ORAL DAILY
COMMUNITY
Start: 2023-07-03

## 2023-08-16 RX ORDER — DONEPEZIL HYDROCHLORIDE 5 MG/1
5 TABLET, FILM COATED ORAL DAILY
Qty: 90 TABLET | Refills: 1 | Status: SHIPPED | OUTPATIENT
Start: 2023-08-16 | End: 2023-11-27

## 2023-08-16 RX ORDER — SERTRALINE HYDROCHLORIDE 25 MG/1
25 TABLET, FILM COATED ORAL DAILY
Qty: 90 TABLET | Refills: 1 | Status: SHIPPED | OUTPATIENT
Start: 2023-08-16 | End: 2024-02-19

## 2023-08-16 NOTE — PROGRESS NOTES
HPI: Fanta Valdez is a 83 y.o. female with evidence for Alzheimer's disease. She has HTN and HLD.     She presents today for a follow up visit. She is here with her granddaughter today. Zoloft started at her last visit for anxiety and agitation. This has been very effective. She is more engaged, and has returned to painting for hobby.     Memory loss is not any better with management of her anxiety.     Her appetite is good, but she has lost some weight.     She still cuts the grass with a push mower. She does not drive.     No delusions, hallucinations, falls, or tremors.     She is sleeping well at night.    She continues on Namenda and Aricept.     She is not wandering. She lives alone, and has Meals on Wheels.   Anti HTN meds are being adjusted by other providers    Stays very active in her yard. Keeps all of her housework up.    Review of Systems   Unable to perform ROS: Dementia         I have reviewed all of this patient's past medical and surgical histories as well as family and social histories and active allergies and medications as documented in the electronic medical record.    Exam:  Gen Appearance, well developed/nourished in no apparent distress  CV: 2+ distal pulses with no edema or swelling  Neuro:  MS: Awake, alert, oriented to place, person, but not to month and but to day of week with prompting, and intact situation. Sustains attention. Recent recall is at least moderately challenged and she repeats self in conversation many times minutes apart/remote memory intact, Language is full to spontaneous speech/repetition/naming/comprehension. Fund of Knowledge is full.   *Clock drawing is impaired today-poor planning/spatial/graphical impairment.   CN: PERRL, Extraoccular movements and visual fields are full. Normal facial sensation and strength, Hearing symmetric, Tongue and Palate are midline and strong. Shoulder Shrug symmetric and strong.  Motor: Normal bulk, tone, no abnormal movements. 5/5  strength bilateral upper/lower extremities with 2+ reflexes and no clonus  Sensory: symmetric to light touch, pain, temp, and vibration/proprioception. Romberg negative  Cerebellar: Finger-nose,Heal-shin, Rapid alternating movements intact  Gait: Normal stance, no ataxia    Imagin CT head:   1.  No CT evidence of an acute intracranial abnormality.  2.  Atrophy and small vessel ischemic changes of the periventricular white matter.    Labs:    TSH and folate normal  B12 lower normal    Assessment/Plan: Fanta Valdez is a 83 y.o. female with short term memory loss.     I recommend:   1.  B12 level lower normal: Continue OTC B12 daily. Follow levels over time.     2. Neuropsychological testing: consistent with Alzheimer's. Her history of high blood pressure may have also contributed but mostly the findings are due to Alzheimer's.   -Namenda to continue   -She is on Aricept per PCP with some bradycardia noted (asymptomatic). Will not raise dose (also on metoprolol)  -Discussed the importance of good social, physical, and cognitive exercises. Discussed how the Mediterranean diet may reduce the risk of cognitive decline.    3. She no longer has access to drive. It is not safe for her to drive. I additionally advised her to stop cutting her grass with a push mower, due to safety concerns.   -alternative activities were discussed to help patient pass her time during the day.     4. Continue Zoloft 25 mg to treat agitation/anxiety. Monitor for any worsening of mood.   To ER with any threat of harm to self or others.     FU 6 months

## 2023-11-27 DIAGNOSIS — F02.80 ALZHEIMER DISEASE: ICD-10-CM

## 2023-11-27 DIAGNOSIS — G30.9 ALZHEIMER DISEASE: ICD-10-CM

## 2023-11-27 RX ORDER — DONEPEZIL HYDROCHLORIDE 5 MG/1
5 TABLET, FILM COATED ORAL DAILY
Qty: 90 TABLET | Refills: 1 | Status: SHIPPED | OUTPATIENT
Start: 2023-11-27

## 2024-02-12 ENCOUNTER — INFUSION (OUTPATIENT)
Dept: INFUSION THERAPY | Facility: HOSPITAL | Age: 84
End: 2024-02-12
Attending: STUDENT IN AN ORGANIZED HEALTH CARE EDUCATION/TRAINING PROGRAM
Payer: MEDICARE

## 2024-02-12 VITALS
DIASTOLIC BLOOD PRESSURE: 77 MMHG | RESPIRATION RATE: 18 BRPM | TEMPERATURE: 98 F | SYSTOLIC BLOOD PRESSURE: 178 MMHG | HEART RATE: 54 BPM

## 2024-02-12 DIAGNOSIS — M81.0 AGE-RELATED OSTEOPOROSIS WITHOUT CURRENT PATHOLOGICAL FRACTURE: Primary | ICD-10-CM

## 2024-02-12 PROCEDURE — 63600175 PHARM REV CODE 636 W HCPCS: Mod: JZ,TB | Performed by: STUDENT IN AN ORGANIZED HEALTH CARE EDUCATION/TRAINING PROGRAM

## 2024-02-12 PROCEDURE — 96372 THER/PROPH/DIAG INJ SC/IM: CPT

## 2024-02-12 RX ADMIN — DENOSUMAB 60 MG: 60 INJECTION SUBCUTANEOUS at 02:02

## 2024-02-19 ENCOUNTER — OFFICE VISIT (OUTPATIENT)
Dept: NEUROLOGY | Facility: CLINIC | Age: 84
End: 2024-02-19
Payer: MEDICARE

## 2024-02-19 VITALS
HEART RATE: 58 BPM | DIASTOLIC BLOOD PRESSURE: 70 MMHG | SYSTOLIC BLOOD PRESSURE: 162 MMHG | WEIGHT: 116.38 LBS | HEIGHT: 59 IN | BODY MASS INDEX: 23.46 KG/M2 | RESPIRATION RATE: 14 BRPM

## 2024-02-19 DIAGNOSIS — G30.9 ALZHEIMER DISEASE: ICD-10-CM

## 2024-02-19 DIAGNOSIS — F41.9 ANXIETY: Primary | ICD-10-CM

## 2024-02-19 DIAGNOSIS — E53.8 B12 DEFICIENCY: ICD-10-CM

## 2024-02-19 DIAGNOSIS — F05 SUNDOWNING: ICD-10-CM

## 2024-02-19 DIAGNOSIS — F02.80 ALZHEIMER DISEASE: ICD-10-CM

## 2024-02-19 PROCEDURE — 99999 PR PBB SHADOW E&M-EST. PATIENT-LVL III: CPT | Mod: PBBFAC,,, | Performed by: PSYCHIATRY & NEUROLOGY

## 2024-02-19 PROCEDURE — 99214 OFFICE O/P EST MOD 30 MIN: CPT | Mod: S$PBB | Performed by: PSYCHIATRY & NEUROLOGY

## 2024-02-19 PROCEDURE — 99213 OFFICE O/P EST LOW 20 MIN: CPT | Mod: PBBFAC | Performed by: PSYCHIATRY & NEUROLOGY

## 2024-02-19 PROCEDURE — 99999 PR STA SHADOW: CPT | Mod: PBBFAC,,, | Performed by: PSYCHIATRY & NEUROLOGY

## 2024-02-19 RX ORDER — SERTRALINE HYDROCHLORIDE 50 MG/1
50 TABLET, FILM COATED ORAL DAILY
Qty: 30 TABLET | Refills: 11 | Status: SHIPPED | OUTPATIENT
Start: 2024-02-19 | End: 2025-02-18

## 2024-02-19 NOTE — PROGRESS NOTES
HPI: Fanta Valdez is a 83 y.o. female with a history of memory problems    Here for follow up    Since the last visit with me, the patient has been seeing NP, Candice Avery, in Neurology in this clinic    Note is reviewed    Patient was having more confusion    She is no longer driving    She was started on Zoloft and mood is good      Memory is still a challenges    No wandering    NO hallucinations    Alone during the day and tolerates this well    Family monitors with cameras at the home and life [employer] as she always carries her phone.     She has some sundowning at night and her daughter will stay with her during this time if this occurs/ not nightly. Describes as more anxiety and forgetfulness    She is using a push mower which has a kill switch if hands are off and she tolerates this well    No longer driving    Sleep is good    Pusle is good    ADLs are all otherwise intact    She likes to paint and coloring      B12 use ongoing    No longer cooks    Granddaughter here today      Patient lives alone and has support from  daughter and granddaughter     Review of Systems   Constitutional:  Negative for fever.   HENT:  Negative for nosebleeds.    Eyes:  Negative for double vision.   Respiratory:  Negative for hemoptysis.    Cardiovascular:  Negative for leg swelling.   Gastrointestinal:  Negative for blood in stool.   Genitourinary:  Negative for hematuria.   Musculoskeletal:  Negative for falls.   Skin:  Negative for rash.   Neurological:  Negative for tremors.   Psychiatric/Behavioral:  Positive for memory loss.          I have reviewed all of this patient's past medical and surgical histories as well as family and social histories and active allergies and medications as documented in the electronic medical record.        Exam:  Gen Appearance, well developed/nourished in no apparent distress  CV: 2+ distal pulses with no edema or swelling  Neuro:  MS: Awake, alert, oriented to place, person, but not to  month or season and  not fully intact to situation. Sustains attention. Recent recall is at least moderately impaired and she repeats self in conversation at times/remote memory intact, Language is full to spontaneous speech//comprehension. Fund of Knowledge is full  CN: Optic discs are flat with normal vasculature, PERRL, Extraoccular movements and visual fields are full. Normal facial sensation and strength, Hearing symmetric, Tongue and Palate are midline and strong. Shoulder Shrug symmetric and strong.  Motor: Normal bulk, tone, no abnormal movements. 5/5 strength bilateral upper/lower extremities with 2+ reflexes and no clonus  Sensory: symmetric to  temp, and vibration,  Romberg negative  Cerebellar: Finger-nose,Heal-shin, Rapid alternating movements intact  Gait: Normal stance, no ataxia    Imagin CT head: 1.  No CT evidence of an acute intracranial abnormality.     2.  Atrophy and small vessel ischemic changes of the periventricular white matter.    Labs:  TSH and folate normal  B12 lower normal    B12 300 in     Assessment/Plan: Fanta Valdez is a 83 y.o. female with short term memory loss.   I recommend:         I recommend:   1.  B12 level lower normal: Continue OTC B12 daily. Follow levels over time.      2. Neuropsychological testing: consistent with Alzheimer's. Her history of high blood pressure may have also contributed but mostly the findings are due to Alzheimer's.   -Namenda to continue   -She is on Aricept per PCP with some bradycardia noted (asymptomatic). Will not raise dose (also on metoprolol and this dose was lowered by cardiology recently)  -Discussed the importance of good social, physical, and cognitive exercises. Discussed how the Mediterranean diet may reduce the risk of cognitive decline.   -Leqembi is not recommended due to her more moderate stage of disease.     3. She no longer has access to drive. It is not safe for her to drive. She is currently tolerating  operating a push mower with a kill switch. Will need more supervision if worse/wandering.   -alternative activities were discussed to help patient pass her time during the day rior.      4. Continue Zoloft 25 mg to treat agitation/anxiety. Monitor for any worsening of mood.   Can raise dose to 50mg daily Unless sedation, mood changes or other side effects to see the effect on Sundowning and anxiety. If worse, will switch to trazodone  To ER with any threat of harm to self or others. dicussed prior    RTC 6 months

## 2024-03-04 DIAGNOSIS — G30.9 ALZHEIMER DISEASE: ICD-10-CM

## 2024-03-04 DIAGNOSIS — F02.80 ALZHEIMER DISEASE: ICD-10-CM

## 2024-03-05 RX ORDER — MEMANTINE HYDROCHLORIDE 10 MG/1
10 TABLET ORAL 2 TIMES DAILY
Qty: 180 TABLET | Refills: 3 | Status: SHIPPED | OUTPATIENT
Start: 2024-03-05

## 2024-06-28 DIAGNOSIS — G30.9 ALZHEIMER DISEASE: ICD-10-CM

## 2024-06-28 DIAGNOSIS — F02.80 ALZHEIMER DISEASE: ICD-10-CM

## 2024-07-01 RX ORDER — DONEPEZIL HYDROCHLORIDE 5 MG/1
5 TABLET, FILM COATED ORAL DAILY
Qty: 90 TABLET | Refills: 1 | Status: SHIPPED | OUTPATIENT
Start: 2024-07-01

## 2024-08-09 ENCOUNTER — INFUSION (OUTPATIENT)
Dept: INFUSION THERAPY | Facility: HOSPITAL | Age: 84
End: 2024-08-09
Attending: STUDENT IN AN ORGANIZED HEALTH CARE EDUCATION/TRAINING PROGRAM
Payer: MEDICARE

## 2024-08-09 VITALS
SYSTOLIC BLOOD PRESSURE: 140 MMHG | DIASTOLIC BLOOD PRESSURE: 56 MMHG | RESPIRATION RATE: 20 BRPM | TEMPERATURE: 98 F | HEART RATE: 59 BPM

## 2024-08-09 DIAGNOSIS — M81.0 AGE-RELATED OSTEOPOROSIS WITHOUT CURRENT PATHOLOGICAL FRACTURE: Primary | ICD-10-CM

## 2024-08-09 PROCEDURE — 96372 THER/PROPH/DIAG INJ SC/IM: CPT

## 2024-09-30 ENCOUNTER — OFFICE VISIT (OUTPATIENT)
Dept: NEUROLOGY | Facility: CLINIC | Age: 84
End: 2024-09-30
Payer: MEDICARE

## 2024-09-30 VITALS
SYSTOLIC BLOOD PRESSURE: 180 MMHG | RESPIRATION RATE: 14 BRPM | BODY MASS INDEX: 22.11 KG/M2 | HEART RATE: 50 BPM | HEIGHT: 60 IN | WEIGHT: 112.63 LBS | DIASTOLIC BLOOD PRESSURE: 68 MMHG

## 2024-09-30 DIAGNOSIS — I10 HYPERTENSION, UNSPECIFIED TYPE: ICD-10-CM

## 2024-09-30 DIAGNOSIS — E53.8 B12 DEFICIENCY: ICD-10-CM

## 2024-09-30 DIAGNOSIS — R00.1 BRADYCARDIA: ICD-10-CM

## 2024-09-30 DIAGNOSIS — F02.811 ALZHEIMER'S DEMENTIA WITH AGITATION, UNSPECIFIED DEMENTIA SEVERITY, UNSPECIFIED TIMING OF DEMENTIA ONSET: ICD-10-CM

## 2024-09-30 DIAGNOSIS — G30.9 ALZHEIMER'S DEMENTIA WITH AGITATION, UNSPECIFIED DEMENTIA SEVERITY, UNSPECIFIED TIMING OF DEMENTIA ONSET: ICD-10-CM

## 2024-09-30 DIAGNOSIS — F41.9 ANXIETY: Primary | ICD-10-CM

## 2024-09-30 PROCEDURE — 99999 PR STA SHADOW: CPT | Mod: PBBFAC,,, | Performed by: PSYCHIATRY & NEUROLOGY

## 2024-09-30 PROCEDURE — 99213 OFFICE O/P EST LOW 20 MIN: CPT | Mod: PBBFAC | Performed by: PSYCHIATRY & NEUROLOGY

## 2024-09-30 PROCEDURE — 99999 PR PBB SHADOW E&M-EST. PATIENT-LVL III: CPT | Mod: PBBFAC,,, | Performed by: PSYCHIATRY & NEUROLOGY

## 2024-09-30 PROCEDURE — 99214 OFFICE O/P EST MOD 30 MIN: CPT | Mod: S$PBB | Performed by: PSYCHIATRY & NEUROLOGY

## 2024-09-30 PROCEDURE — G2211 COMPLEX E/M VISIT ADD ON: HCPCS | Mod: S$PBB | Performed by: PSYCHIATRY & NEUROLOGY

## 2024-09-30 NOTE — PROGRESS NOTES
HPI: Fanta Valdez is a 84 y.o. female with a history of memory problems    Here for 6 months follow up      Memory    Mood is better due to increased zoloft dose/ Much less anxiety    Zoloft dose was increased at the last visit to 50mg      Sundowning is still noted at times. Family monitors her by cameras    She will be noted to have difficulty moving and checking things in the house. She can't get out the home without family knowing and she has not tried.    During the day, she goal oriented.       No hallucinations    Pulse 50    BP elevated today. Not checked at home    Alone during the day and tolerates this well    Family monitors with cameras at the home and [employer] [place] as she always carries her phone.     She is using a push mower which has a kill switch if hands are off and she tolerates this well    No longer driving    B12 use ongoing   No falls. She walks slower    Family here today      ROS not reliable due to dementia      I have reviewed all of this patient's past medical and surgical histories as well as family and social histories and active allergies and medications as documented in the electronic medical record.        Exam:  Gen Appearance, well developed/nourished in no apparent distress  CV: 2+ distal pulses with no edema or swelling  Neuro:  MS: Awake, alert, oriented to place, person, but not to month but nearly to season and  not fully intact to situation. Sustains attention. Recent recall is at least moderately impaired and she repeats self in conversation at times as prior/remote memory intact, Language is full to spontaneous speech/comprehension. Fund of Knowledge is full  CN: Optic discs are flat with normal vasculature, PERRL, Extraoccular movements and visual fields are full. Normal facial sensation and strength, Hearing symmetric, Tongue and Palate are midline and strong. Shoulder Shrug symmetric and strong.  Motor: Normal bulk, tone, no abnormal movements. 5/5 strength bilateral  upper/lower extremities with 2+ reflexes and no clonus  Sensory: symmetric to  temp, and vibration,  Romberg negative  Cerebellar: Finger-nose,Heal-shin, Rapid alternating movements intact  Gait: Normal stance, no ataxia and no clonus    Imagin CT head: 1.  No CT evidence of an acute intracranial abnormality.     2.  Atrophy and small vessel ischemic changes of the periventricular white matter.    Labs:  TSH and folate normal  B12 lower normal    B12 300 in     Assessment/Plan: Fanta Valdez is a 84 y.o. female with short term memory loss.   I recommend:         I recommend:   1.  B12 level lower normal: Continue OTC B12 daily. Improved in      2. Neuropsychological testing: consistent with Alzheimer's. Her history of high blood pressure may have also contributed but mostly the findings are due to Alzheimer's.   -Namenda to continue   -She is on Aricept per PCP with some bradycardia noted (asymptomatic). Will not raise dose (also on metoprolol and this dose was lowered by cardiology recently)  -Discussed the importance of good social, physical, and cognitive exercises prior. Discussed prior how the Mediterranean diet may reduce the risk of cognitive decline.   -Leqembi is not recommended due to her more moderate stage of disease.     3. She no longer has access to drive. It is not safe for her to drive. She is currently tolerating operating a push mower with a kill switch. Will need more supervision if worse/wandering.   -alternative activities were discussed prior to help patient pass her time during the day prior.   -refer to Dementia support Guide model per orders       4. Continue Zoloft 50mg to treat agitation/anxiety. Monitor for any worsening of mood. This is helpful  -Monitor sundowning. Needs better long term planning discussed with family today  To ER with any threat of harm to self or others. dicussed prior    5. BP elevated today. Family will monitor at home and see cardiology if  this remains elevated     RTC 6 months      Visit today included increased complexity associated with the care of the episodic problem memory loss addressed and managing the longitudinal care of the patient due to the serious and/or complex managed problem(s) alzheimer's dementia.

## 2024-10-23 ENCOUNTER — PATIENT OUTREACH (OUTPATIENT)
Dept: NEUROLOGY | Facility: CLINIC | Age: 84
End: 2024-10-23
Payer: MEDICARE

## 2024-10-23 ENCOUNTER — PATIENT MESSAGE (OUTPATIENT)
Dept: NEUROLOGY | Facility: CLINIC | Age: 84
End: 2024-10-23
Payer: MEDICARE

## 2024-10-23 NOTE — PROGRESS NOTES
Dementia Care Management    Recruitment Call      Date of Service: 10/23/2024  Care Navigator completing this form: Rhona Contreras  Referred by: Other Dr. Avel Castillo    PCP: AZUCENA Mcmahon MD    Patient: Fanta Valdez  MRN: [id number]  : 1940  Age: 84 y.o.  Gender: female  Race: White  Ethnicity: Not  or /a    Objective:   Patient and caregiver potentially eligible for [facility] dementia care management programs.    CTN/SW completed outreach attempt on 10/23/2024 to assess patient/caregiver interest in the program and screen for eligibility.       Outreach Outcome:   Patient ineligible for GUIDE at this time due to living area. [facility] is not currently equipped to provide in-home respite services for patients living in [place], which is a Medicare requirement. CTN explained current situation to caregiver, who expressed understanding and was agreeable to being on a wait list for when [facility] expands. CTN assessed immediate needs, no major concerns were reported. Caregiver will inform his siblings, CTN encouraged caregiver to save her number and reach out if there were any dementia related care needs. Caregiver expressed understanding.       Dyad screened for: CMS GUIDE Model - Ineligible at this time. CTN remains available to provide support, as needed.

## 2024-12-09 ENCOUNTER — PATIENT OUTREACH (OUTPATIENT)
Dept: NEUROLOGY | Facility: CLINIC | Age: 84
End: 2024-12-09
Payer: MEDICARE

## 2024-12-09 NOTE — PROGRESS NOTES
Dementia Care Management    Recruitment Call      Date of Service: 2024  Care Navigator completing this form: Rhona Contreras  PCP: AZUCENA Mcmahon MD    Patient: Fanta Valdez  MRN: [id number]  : 1940  Age: 84 y.o.  Gender: female  Race: White  Ethnicity: Not  or /a    Objective:   Patient and caregiver potentially eligible for [facility] dementia care management programs.    CTN/SW completed outreach attempt on 2024 to assess patient/caregiver interest in the program and screen for eligibility.       Outreach Outcome:   Caregiver Kathleen requested a call back to discuss GUIDE availability for the [place] or [place] Areas. [facility] is not yet equipped to serve these areas and CTN provided caregiver with this information. CTN also shared that the respite support offered through GUIDE was limited, and caregiver was actually looking for a few more hours of support to engage patient and assist with some activities of daily living. CTN and caregiver discussed adult day centers, Medicaid application and waivers, and PACE in [place].     Caregiver Kathleen lives in [place], but her siblings live in [place] and are able to support patient if she moves close by.     CTN encouraged caregiver to reach out as needed. Email sent with the following resources:    1. Medicaid Long Term Care Handout  2. Community Choice Waiver Handout  3. LTPC Handout  4. ADHC Handout  5. Link to [facility] [place]  6. Links to [place] and [place] Councils on Aging.    Next contact: PRN.

## 2025-01-13 ENCOUNTER — TELEPHONE (OUTPATIENT)
Dept: NEUROLOGY | Facility: CLINIC | Age: 85
End: 2025-01-13
Payer: MEDICARE

## 2025-01-13 NOTE — TELEPHONE ENCOUNTER
Pt's daughter is contacting clinic stating she needs a letter from Dr Castillo stating that her mother can not make any decisions on her own.  Pt's daughter is trying to help her mother with SSI and Medicare.  Please advise.    Daughter's contact info:  Kathleen Willis  # [phone]  Email: [email or web address]

## 2025-01-14 NOTE — TELEPHONE ENCOUNTER
Notified Kathleen (patient's daughter)  has completed letter, signed, offered to mail original and/or upload via portal. Kathleen verbalized understanding, desires original mailed via [employer] to Kathleen's home address [address] [place], [place], [address] and upload via portal to have access to letter with signature. Letter uploaded via [employer] portal and original letter mailed via [employer] to [address] [place], [address].

## 2025-02-11 ENCOUNTER — PATIENT OUTREACH (OUTPATIENT)
Dept: NEUROLOGY | Facility: CLINIC | Age: 85
End: 2025-02-11
Payer: MEDICARE

## 2025-02-11 NOTE — PROGRESS NOTES
Dementia Care Management          Date of Service: 2025  Care Navigator completing this form: Rhona Contreras  PCP: AZUCENA Mcmahon MD    Patient: Fanta Valdez  MRN: [id number]  : 1940  Age: 84 y.o.  Gender: female  Race: White  Ethnicity: Not  or /a    Caregiver Kathleen called to follow up on previous conversation about Medicaid services. Caregiver was told that patient did not qualify for Medicaid, however this information was provided by the General Medicare line. CTN advised caregiver to reach out to Long Term Care Medicare, provided education on what this was and contact information.     Caregiver shared that patient will live with her sister in [place] and they are interested in adult day programs in the area. CTN sent information on [facility] via email.      Next contact: PRN.

## 2025-02-21 ENCOUNTER — INFUSION (OUTPATIENT)
Dept: INFUSION THERAPY | Facility: HOSPITAL | Age: 85
End: 2025-02-21
Attending: STUDENT IN AN ORGANIZED HEALTH CARE EDUCATION/TRAINING PROGRAM
Payer: MEDICARE

## 2025-02-21 VITALS
SYSTOLIC BLOOD PRESSURE: 180 MMHG | TEMPERATURE: 97 F | HEART RATE: 51 BPM | DIASTOLIC BLOOD PRESSURE: 80 MMHG | OXYGEN SATURATION: 97 % | RESPIRATION RATE: 18 BRPM

## 2025-02-21 DIAGNOSIS — M81.0 AGE-RELATED OSTEOPOROSIS WITHOUT CURRENT PATHOLOGICAL FRACTURE: Primary | ICD-10-CM

## 2025-02-21 PROCEDURE — 96372 THER/PROPH/DIAG INJ SC/IM: CPT

## 2025-02-21 PROCEDURE — 63600175 PHARM REV CODE 636 W HCPCS: Mod: JZ,TB | Performed by: STUDENT IN AN ORGANIZED HEALTH CARE EDUCATION/TRAINING PROGRAM

## 2025-02-21 RX ADMIN — DENOSUMAB 60 MG: 60 INJECTION SUBCUTANEOUS at 02:02

## 2025-03-25 DIAGNOSIS — F41.9 ANXIETY: Primary | ICD-10-CM

## 2025-03-25 RX ORDER — SERTRALINE HYDROCHLORIDE 50 MG/1
50 TABLET, FILM COATED ORAL DAILY
Qty: 30 TABLET | Refills: 11 | Status: SHIPPED | OUTPATIENT
Start: 2025-03-25 | End: 2026-03-25

## 2025-03-28 ENCOUNTER — OFFICE VISIT (OUTPATIENT)
Dept: ENDOCRINOLOGY | Facility: CLINIC | Age: 85
End: 2025-03-28
Payer: MEDICARE

## 2025-03-28 VITALS
BODY MASS INDEX: 21.72 KG/M2 | DIASTOLIC BLOOD PRESSURE: 64 MMHG | HEIGHT: 60 IN | WEIGHT: 110.63 LBS | SYSTOLIC BLOOD PRESSURE: 110 MMHG

## 2025-03-28 DIAGNOSIS — M81.0 AGE-RELATED OSTEOPOROSIS WITHOUT CURRENT PATHOLOGICAL FRACTURE: Primary | ICD-10-CM

## 2025-03-28 DIAGNOSIS — E78.2 MIXED HYPERLIPIDEMIA: ICD-10-CM

## 2025-03-28 DIAGNOSIS — I10 PRIMARY HYPERTENSION: ICD-10-CM

## 2025-03-28 PROCEDURE — 99213 OFFICE O/P EST LOW 20 MIN: CPT | Mod: PBBFAC | Performed by: STUDENT IN AN ORGANIZED HEALTH CARE EDUCATION/TRAINING PROGRAM

## 2025-03-28 PROCEDURE — 99999 PR PBB SHADOW E&M-EST. PATIENT-LVL III: CPT | Mod: PBBFAC,,, | Performed by: STUDENT IN AN ORGANIZED HEALTH CARE EDUCATION/TRAINING PROGRAM

## 2025-03-28 NOTE — PROGRESS NOTES
"Subjective:      Patient ID: Fanta Valdez is a 84 y.o. female.    Chief Complaint:  Osteoporosis    History of Present Illness  This is a 84 y.o. female. with a past medical history of HTN, HLD here for follow up of osteoporosis.        Osteoporosis  Diagnosed in 2021    Currently on Prolia 60 mg q 6 months, started in Aug 2023    No history of fractures  No history of kidney stones  No height loss  Menopause at age late 40s  No exposure to glucocorticoids  No history of smoking  Family history of hip fracture in mother  Not on PPI therapy    No recent falls  Takes fall precautions  She uses rails for stairs   Takes baths     Dental visits: 2022, no issues    DXA (Jul 2023)  The L1 to L4 vertebral bone mineral density is equal to 0.859 g/cm squared with a T score of -2.7.  The left femoral neck bone mineral density is equal to 0.615 g/cm squared with a T score of -3.0.  There is a 32.3% risk of a major osteoporotic fracture and a 13.9% risk of hip fracture in the next 10 years (FRAX).  Impression:  Osteoporosis of the left hip, osteoporosis of the lumbar spine    DXA (Jan 2021)  L spine: BMD 0.84, T score - 2.8  Bilateral femurs: 0.70, T score -2.5  Impression: Osteoporosis      Review of Systems  As above    Social and family history reviewed  Current medications and allergies reviewed    Objective:   /64   Ht 4' 11.5" (1.511 m)   Wt 50.2 kg (110 lb 9.6 oz)   BMI 21.96 kg/m²   Physical Exam  Alert, oriented    BP Readings from Last 1 Encounters:   03/28/25 110/64      Wt Readings from Last 1 Encounters:   03/28/25 1515 50.2 kg (110 lb 9.6 oz)     Body mass index is 21.96 kg/m².    Lab Review:   Lab Results   Component Value Date    HGBA1C 6.1 05/02/2022     No results found for: "CHOL", "HDL", "LDLCALC", "TRIG", "CHOLHDL"  Lab Results   Component Value Date     07/14/2023    K 4.7 07/14/2023     07/14/2023    CO2 24 07/14/2023     (H) 07/14/2023    BUN 31 (H) 07/14/2023    CREATININE " 1.6 (H) 07/14/2023    CALCIUM 10.3 07/14/2023    ALBUMIN 4.0 07/14/2023    ANIONGAP 12 07/14/2023    TSH 1.980 07/14/2023       All pertinent labs reviewed    Assessment and Plan     Age-related osteoporosis without current pathological fracture  Confirmed with T score < - 2.5  Risk factors include ethnicity, low body weight, postmenopausal state, family history  Started Prolia in Aug 2023, no side effects  Discussed non-pharmacological interventions in detail: fall precautions, calcium intake, exercise     Plan  - Continue Prolia 60 mg q6 months  - Check vitamin D, renal function panel in July 2025  - DXA in July 2025    F/u 2 years    HTN (hypertension)  Continue antihypertensive regimen including ARB/ACEi    HLD (hyperlipidemia)  Continue statin  Monitored by PCP    Forest Smith MD  Endocrinology

## 2025-03-28 NOTE — ASSESSMENT & PLAN NOTE
Confirmed with T score < - 2.5  Risk factors include ethnicity, low body weight, postmenopausal state, family history  Started Prolia in Aug 2023, no side effects  Discussed non-pharmacological interventions in detail: fall precautions, calcium intake, exercise     Plan  - Continue Prolia 60 mg q6 months  - Check vitamin D, renal function panel in July 2025  - DXA in July 2025    F/u 2 years

## 2025-04-14 ENCOUNTER — OFFICE VISIT (OUTPATIENT)
Dept: NEUROLOGY | Facility: CLINIC | Age: 85
End: 2025-04-14
Payer: MEDICARE

## 2025-04-14 VITALS
SYSTOLIC BLOOD PRESSURE: 110 MMHG | BODY MASS INDEX: 21.73 KG/M2 | DIASTOLIC BLOOD PRESSURE: 58 MMHG | OXYGEN SATURATION: 99 % | HEIGHT: 60 IN | WEIGHT: 110.69 LBS | HEART RATE: 58 BPM | RESPIRATION RATE: 16 BRPM

## 2025-04-14 DIAGNOSIS — F41.9 ANXIETY: ICD-10-CM

## 2025-04-14 DIAGNOSIS — E53.8 B12 DEFICIENCY: ICD-10-CM

## 2025-04-14 DIAGNOSIS — G30.9 ALZHEIMER'S DEMENTIA WITH AGITATION, UNSPECIFIED DEMENTIA SEVERITY, UNSPECIFIED TIMING OF DEMENTIA ONSET: Primary | ICD-10-CM

## 2025-04-14 DIAGNOSIS — R00.1 BRADYCARDIA: ICD-10-CM

## 2025-04-14 DIAGNOSIS — F02.811 ALZHEIMER'S DEMENTIA WITH AGITATION, UNSPECIFIED DEMENTIA SEVERITY, UNSPECIFIED TIMING OF DEMENTIA ONSET: Primary | ICD-10-CM

## 2025-04-14 PROCEDURE — 99999 PR PBB SHADOW E&M-EST. PATIENT-LVL III: CPT | Mod: PBBFAC,,, | Performed by: PSYCHIATRY & NEUROLOGY

## 2025-04-14 PROCEDURE — 99205 OFFICE O/P NEW HI 60 MIN: CPT | Mod: S$PBB | Performed by: PSYCHIATRY & NEUROLOGY

## 2025-04-14 PROCEDURE — 99999 PR STA SHADOW: CPT | Mod: PBBFAC,,, | Performed by: PSYCHIATRY & NEUROLOGY

## 2025-04-14 PROCEDURE — G2211 COMPLEX E/M VISIT ADD ON: HCPCS | Mod: S$PBB | Performed by: PSYCHIATRY & NEUROLOGY

## 2025-04-14 PROCEDURE — 99213 OFFICE O/P EST LOW 20 MIN: CPT | Mod: PBBFAC | Performed by: PSYCHIATRY & NEUROLOGY

## 2025-04-14 NOTE — PROGRESS NOTES
HPI: Fanta Valdez is a 84 y.o. female with a history of memory problems      This is her 6 months follow up    Has had contact with neuropsych support    Sundowning is not worse but she still seems more forgetful at night mildy     Memory is stable lately     Mood is good    No Hallucinations    No Wandering      Family monitors by cameras/she can't get out the home without family knowing     She also is monitored by [employer] as she always carries her phone which she uses still well    Cuts her own grass      Pulse 58    BP normal    No Falls      No longer driving    B12 use ongoing    Meds and bills are monitored by family     Family here today      ROS not reliable due to dementia      I have reviewed all of this patient's past medical and surgical histories as well as family and social histories and active allergies and medications as documented in the electronic medical record.        Exam:  Gen Appearance, well developed/nourished in no apparent distress  CV: 2+ distal pulses with no edema or swelling  Neuro:  MS: Awake, alert, oriented to place, person, but not to time or situation. Sustains attention. Recent recall is at least moderately-severely  impaired and she repeats self in conversation at times as prior/remote memory intact, Language is full to spontaneous speech/comprehension. Fund of Knowledge is full  CN: Optic discs are flat with normal vasculature, PERRL, Extraoccular movements and visual fields are full. Normal facial sensation and strength, Hearing symmetric, Tongue and Palate are midline and strong. Shoulder Shrug symmetric and strong.  Motor: Normal bulk, tone, no abnormal movements. 5/5 strength bilateral upper/lower extremities with 2+ reflexes  Sensory: symmetric to  temp, and vibration,  Romberg negative  Cerebellar: Finger-nose,Heal-shin, Rapid alternating movements intact  Gait: Normal stance, no ataxia     Imagin CT head: 1.  No CT evidence of an acute intracranial  abnormality.     2.  Atrophy and small vessel ischemic changes of the periventricular white matter.    Labs: 2021 TSH and folate normal  B12 lower normal    B12 300 in 2023    Assessment/Plan: Fanta Valdez is a 84 y.o. female with short term memory loss.   I recommend:         I recommend:   1. 2021 B12 level lower normal: Continue OTC B12 daily. Improved in 2023     2. Neuropsychological testing: consistent with Alzheimer's. Her history of high blood pressure may have also contributed but mostly the findings are due to Alzheimer's.   -Namenda to continue   -She is on Aricept per PCP with some bradycardia noted (asymptomatic). Will not raise dose (also on metoprolol and this dose was lowered by cardiology since)  -Discussed the importance of good social, physical, and cognitive exercises again. Discussed prior how the Mediterranean diet may reduce the risk of cognitive decline.   -Leqembi is not recommended due to her more moderate stage of disease.     3. Remain off driving. She is currently tolerating operating a push mower with a kill switch. Will need more supervision if worse/wandering.   -alternative activities were discussed prior to help patient pass her time during the day prior.   -referred to Dementia support  prior. Contact is PRN now       4. Continue Zoloft 50mg to treat agitation/anxiety. Monitor for any worsening of mood. This is helpful  -Monitor sundowning/ doing well with current level of care  -Family plans to keep her in her home as long as possible/possibly living with her if needed going forward.     5. BP elevated today. Family will monitor at home and see cardiology if this remains elevated     RTC 6 months      Visit today included increased complexity associated with the care of the episodic problem memory loss addressed and managing the longitudinal care of the patient due to the serious and/or complex managed problem(s) alzheimer's dementia.

## 2025-04-16 DIAGNOSIS — G30.9 ALZHEIMER DISEASE: ICD-10-CM

## 2025-04-16 DIAGNOSIS — F02.80 ALZHEIMER DISEASE: ICD-10-CM

## 2025-04-19 ENCOUNTER — PATIENT MESSAGE (OUTPATIENT)
Dept: NEUROLOGY | Facility: CLINIC | Age: 85
End: 2025-04-19
Payer: MEDICARE

## 2025-04-22 RX ORDER — MEMANTINE HYDROCHLORIDE 10 MG/1
10 TABLET ORAL 2 TIMES DAILY
Qty: 180 TABLET | Refills: 3 | Status: SHIPPED | OUTPATIENT
Start: 2025-04-22

## 2025-05-14 DIAGNOSIS — F02.80 ALZHEIMER DISEASE: ICD-10-CM

## 2025-05-14 DIAGNOSIS — G30.9 ALZHEIMER DISEASE: ICD-10-CM

## 2025-05-15 RX ORDER — DONEPEZIL HYDROCHLORIDE 5 MG/1
5 TABLET, FILM COATED ORAL
Qty: 90 TABLET | Refills: 3 | Status: SHIPPED | OUTPATIENT
Start: 2025-05-15

## 2025-05-21 ENCOUNTER — PATIENT OUTREACH (OUTPATIENT)
Dept: NEUROLOGY | Facility: CLINIC | Age: 85
End: 2025-05-21
Payer: MEDICARE

## 2025-05-21 NOTE — PROGRESS NOTES
Dementia Care Management    Recruitment Call      Date of Service: 2025  Care Navigator completing this form: Pamela Sparks  PCP: AZUCENA Mcmahon MD    Patient: Fanta Valdez  MRN: [id number]  : 1940  Age: 84 y.o.  Gender: female  Race: White  Ethnicity: Not  or /a    Objective:   Patient and caregiver potentially eligible for [facility] dementia care management programs.    CTN/SW completed outreach attempt on 2025 to assess patient/caregiver interest in the program and screen for eligibility.       Outreach Outcome:   Unable to reach dyad - Left detailed voicemail message with call back number. CTN/SW will make another attempt on 25

## 2025-05-26 ENCOUNTER — PATIENT OUTREACH (OUTPATIENT)
Dept: NEUROLOGY | Facility: CLINIC | Age: 85
End: 2025-05-26
Payer: MEDICARE

## 2025-05-26 NOTE — PROGRESS NOTES
Dementia Care Management    Recruitment Call      Date of Service: 2025  Care Navigator completing this form: Pamela Sparks  PCP: AZUCENA Mcmahno MD    Patient: Fanat Valdez  MRN: [id number]  : 1940  Age: 84 y.o.  Gender: female  Race: White  Ethnicity: Not  or /a    Objective:   Patient and caregiver potentially eligible for [facility] dementia care management programs.    CTN/SW completed outreach attempt on 2025 to assess patient/caregiver interest in the program and screen for eligibility.       Outreach Outcome:   Unable to reach dyad - Left detailed voicemail message with call back number. CTN/SW will make another attempt on 25.    CTN received an in coming call from cg. Cg requested the program information to her email to review with her sister this week. Cg knows how to get in touch if/when they are ready to move forward with the guide model program.

## 2025-05-30 ENCOUNTER — PATIENT OUTREACH (OUTPATIENT)
Dept: NEUROLOGY | Facility: CLINIC | Age: 85
End: 2025-05-30
Payer: MEDICARE

## 2025-05-30 NOTE — PROGRESS NOTES
Dementia Care Management    Recruitment Call      Date of Service: 2025  Care Navigator completing this form: Pamela Sparks  PCP: AZUCENA Mcmahon MD    Patient: Fanta Valdez  MRN: [id number]  : 1940  Age: 84 y.o.  Gender: female  Race: White  Ethnicity: Not  or /a    Objective:   Patient and caregiver potentially eligible for [facility] dementia care management programs.    CTN/SW completed outreach attempt on 2025 to assess patient/caregiver interest in the program and screen for eligibility.       Outreach Outcome:   Recruitment Unsuccessful - CTN/SW was unable to reach patient/caregiver after a minimum of 3 outreach attempts. Care team remains available to provide information if dyad makes contact.

## 2025-06-02 ENCOUNTER — PATIENT OUTREACH (OUTPATIENT)
Dept: NEUROLOGY | Facility: CLINIC | Age: 85
End: 2025-06-02
Payer: MEDICARE

## 2025-06-03 ENCOUNTER — PATIENT OUTREACH (OUTPATIENT)
Dept: NEUROLOGY | Facility: CLINIC | Age: 85
End: 2025-06-03
Payer: MEDICARE

## 2025-06-12 ENCOUNTER — PATIENT OUTREACH (OUTPATIENT)
Dept: NEUROLOGY | Facility: CLINIC | Age: 85
End: 2025-06-12
Payer: MEDICARE

## 2025-06-12 NOTE — PROGRESS NOTES
Dementia Care Management  Pre-Enrollment Call        Virtual Visit - Telehealth  The patient location is: Home  The chief complaint leading to consultation is: GUIDE Model Program  Visit type: Audio Only       Date of Service: 2025    Care Navigator completing this form: Rhona Contreras  Referring Provider: Self-Referral  PCP: AZUCENA Mcmahon MD  Care Team: Patient Care Team:  AZUCENA Mcmahon MD as PCP - General (Internal Medicine)  Rhona Contreras as Care Manager   Are there any other specialty providers that we should be aware of? Yes - Dr. Castillo - Neurology  *If yes, add specialty to Care Team in [facility].    Patient: Fanta Valdez  MRN: [id number]  : 1940  Age: 84 y.o.  Gender: female  Race: White  Ethnicity: Not  or /a  Level of Education: High School graduate, or the equivalent   Patient's Occupation: [employer] - retired 13 years ago.    Objective: Dementia Care Management Pre-Enrollment Call.    Fanta Valdez is a 84 y.o. female who presents for Dementia Care Management Pre-Enrollment Call. Spoke to Caregiver Kathleen (Daughter).      Visit Notes:  Pre-Enrollment Call Completed: Pre-enrollment surveys were documented in Epic flowsheets and Initial Visit will be scheduled after caregiver consults a day/time with family who lives locally since patient will need support connecting to the call.           Psychosocial History   Primary Caregiver: Kathleen (DPOA), Miryam and Nick live locally and help with medication, appointments, checking in once a week.   Family Status: Three adult children (Kathleen - lives in [place]), Miryam and Nick ([place]).   Current Living situation: Patient lives alone in the home, Kathleen expressed her concerns about this, she sees that this is becoming more difficult for patient as she progressively declines.  Support system: Adult children, granddaughter (Sophie Carlson spends every Friday with patient), neighbors, patient's brother and  sister.  Patient's hobbies/interests: Art, likes to paint, work in the yard, likes cats (has a cat and feeds stray cats), wathching TV, spending time with family. Kathleen reports that patient is now less interested in engaging in hobbies.          Caregiver Needs and Resource Assessment     Is the caregiver willing and able to assume, or continue, to provide assistance: Yes    Health Literacy: Extremely    Caregiver needs and wellbeing: Guidance, and objective ear to provide advice.    List of resources that are currently available to the dyad (ex. Enrolled in community programs, sitter services, home health, etc.): Meals on Wheels. Patient used to go to the local [facility] but not anymore.         Surveys (reference Additional Document section for survey attachments)   Health Related Social Needs - C-HRSN Tool  Instrumental Activities of Daily Living/Activities of Daily Living  Dementia Safety Assessment  Behave 5+ (Neuropsychiatric Symptoms)  [place] Interview (22 items)  Global Health PROMIS-10  Pre-Visit Form

## 2025-07-18 ENCOUNTER — HOSPITAL ENCOUNTER (OUTPATIENT)
Dept: RADIOLOGY | Facility: HOSPITAL | Age: 85
Discharge: HOME | End: 2025-07-18
Attending: STUDENT IN AN ORGANIZED HEALTH CARE EDUCATION/TRAINING PROGRAM
Payer: MEDICARE

## 2025-07-18 DIAGNOSIS — M81.0 AGE-RELATED OSTEOPOROSIS WITHOUT CURRENT PATHOLOGICAL FRACTURE: ICD-10-CM

## 2025-07-18 PROCEDURE — 77080 DXA BONE DENSITY AXIAL: CPT | Mod: TC

## 2025-07-18 PROCEDURE — 77080 DXA BONE DENSITY AXIAL: CPT | Mod: 26,,, | Performed by: RADIOLOGY

## 2025-07-21 ENCOUNTER — PATIENT MESSAGE (OUTPATIENT)
Dept: ENDOCRINOLOGY | Facility: CLINIC | Age: 85
End: 2025-07-21
Payer: MEDICARE

## 2025-08-19 ENCOUNTER — PATIENT MESSAGE (OUTPATIENT)
Dept: NEUROLOGY | Facility: CLINIC | Age: 85
End: 2025-08-19

## 2025-08-22 ENCOUNTER — INFUSION (OUTPATIENT)
Dept: INFUSION THERAPY | Facility: HOSPITAL | Age: 85
End: 2025-08-22
Attending: STUDENT IN AN ORGANIZED HEALTH CARE EDUCATION/TRAINING PROGRAM
Payer: MEDICARE

## 2025-08-22 VITALS
SYSTOLIC BLOOD PRESSURE: 172 MMHG | RESPIRATION RATE: 18 BRPM | DIASTOLIC BLOOD PRESSURE: 68 MMHG | TEMPERATURE: 98 F | HEART RATE: 52 BPM

## 2025-08-22 DIAGNOSIS — M81.0 AGE-RELATED OSTEOPOROSIS WITHOUT CURRENT PATHOLOGICAL FRACTURE: Primary | ICD-10-CM

## 2025-08-22 PROCEDURE — 96372 THER/PROPH/DIAG INJ SC/IM: CPT

## 2025-08-22 PROCEDURE — 63600175 PHARM REV CODE 636 W HCPCS: Mod: JZ,TB | Performed by: STUDENT IN AN ORGANIZED HEALTH CARE EDUCATION/TRAINING PROGRAM

## 2025-08-22 RX ADMIN — DENOSUMAB 60 MG: 60 INJECTION SUBCUTANEOUS at 02:08

## 2025-08-25 ENCOUNTER — TELEPHONE (OUTPATIENT)
Dept: NEUROLOGY | Facility: CLINIC | Age: 85
End: 2025-08-25
Payer: MEDICARE

## 2025-08-25 DIAGNOSIS — F02.80 ALZHEIMER DISEASE: ICD-10-CM

## 2025-08-25 DIAGNOSIS — G30.9 ALZHEIMER DISEASE: ICD-10-CM

## 2025-08-25 RX ORDER — DONEPEZIL HYDROCHLORIDE 5 MG/1
5 TABLET, FILM COATED ORAL DAILY
Qty: 90 TABLET | Refills: 3 | Status: SHIPPED | OUTPATIENT
Start: 2025-08-25

## 2025-08-25 RX ORDER — MEMANTINE HYDROCHLORIDE 10 MG/1
10 TABLET ORAL 2 TIMES DAILY
Qty: 180 TABLET | Refills: 3 | Status: SHIPPED | OUTPATIENT
Start: 2025-08-25